# Patient Record
Sex: MALE | Race: WHITE | NOT HISPANIC OR LATINO | Employment: FULL TIME
[De-identification: names, ages, dates, MRNs, and addresses within clinical notes are randomized per-mention and may not be internally consistent; named-entity substitution may affect disease eponyms.]

---

## 2017-07-15 ENCOUNTER — HEALTH MAINTENANCE LETTER (OUTPATIENT)
Age: 22
End: 2017-07-15

## 2020-03-08 ENCOUNTER — HOSPITAL ENCOUNTER (EMERGENCY)
Facility: CLINIC | Age: 25
Discharge: HOME | End: 2020-03-08
Attending: EMERGENCY MEDICINE | Admitting: EMERGENCY MEDICINE
Payer: COMMERCIAL

## 2020-03-08 ENCOUNTER — APPOINTMENT (OUTPATIENT)
Dept: GENERAL RADIOLOGY | Facility: CLINIC | Age: 25
End: 2020-03-08
Attending: EMERGENCY MEDICINE
Payer: COMMERCIAL

## 2020-03-08 VITALS — SYSTOLIC BLOOD PRESSURE: 124 MMHG | DIASTOLIC BLOOD PRESSURE: 69 MMHG | OXYGEN SATURATION: 100 % | HEART RATE: 72 BPM

## 2020-03-08 DIAGNOSIS — S42.022A CLOSED DISPLACED FRACTURE OF SHAFT OF LEFT CLAVICLE, INITIAL ENCOUNTER: ICD-10-CM

## 2020-03-08 PROCEDURE — 23500 CLTX CLAVICULAR FX W/O MNPJ: CPT | Mod: 54 | Performed by: EMERGENCY MEDICINE

## 2020-03-08 PROCEDURE — 23500 CLTX CLAVICULAR FX W/O MNPJ: CPT | Mod: LT | Performed by: EMERGENCY MEDICINE

## 2020-03-08 PROCEDURE — 99284 EMERGENCY DEPT VISIT MOD MDM: CPT | Mod: 25 | Performed by: EMERGENCY MEDICINE

## 2020-03-08 PROCEDURE — 25000132 ZZH RX MED GY IP 250 OP 250 PS 637: Performed by: EMERGENCY MEDICINE

## 2020-03-08 PROCEDURE — 99284 EMERGENCY DEPT VISIT MOD MDM: CPT | Mod: Z6 | Performed by: EMERGENCY MEDICINE

## 2020-03-08 PROCEDURE — 73000 X-RAY EXAM OF COLLAR BONE: CPT | Mod: LT

## 2020-03-08 RX ORDER — OXYCODONE AND ACETAMINOPHEN 5; 325 MG/1; MG/1
1-2 TABLET ORAL EVERY 4 HOURS PRN
Qty: 10 TABLET | Refills: 0 | Status: SHIPPED | OUTPATIENT
Start: 2020-03-08 | End: 2020-12-22

## 2020-03-08 RX ORDER — DIAZEPAM 5 MG
5 TABLET ORAL ONCE
Status: COMPLETED | OUTPATIENT
Start: 2020-03-08 | End: 2020-03-08

## 2020-03-08 RX ADMIN — DIAZEPAM 5 MG: 5 TABLET ORAL at 16:48
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
## 2020-03-08 NOTE — ED AVS SNAPSHOT
[facility] Emergency Department  [address] [address]  Phone:  [phone]  Fax:  [phone]                                    Ryan Rose   MRN: [id number]    Department:  [facility] Emergency Department   Date of Visit:  3/8/2020           After Visit Summary Signature Page    I have received my discharge instructions, and my questions have been answered. I have discussed any challenges I see with this plan with the nurse or doctor.    ..........................................................................................................................................  Patient/Patient Representative Signature      ..........................................................................................................................................  Patient Representative Print Name and Relationship to Patient    ..................................................               ................................................  Date                                   Time    ..........................................................................................................................................  Reviewed by Signature/Title    ...................................................              ..............................................  Date                                               Time          22EPIC Rev 08/18

## 2020-03-09 NOTE — DISCHARGE INSTRUCTIONS
Return if symptoms worsen or new symptoms develop.  Follow-up with orthopedics next available.  Drink plenty of fluids.  If increased pain numbness weakness or other symptoms present please return for recheck.  Take Percocet as directed for pain.

## 2020-11-16 ENCOUNTER — HEALTH MAINTENANCE LETTER (OUTPATIENT)
Age: 25
End: 2020-11-16

## 2020-12-22 ENCOUNTER — OFFICE VISIT (OUTPATIENT)
Dept: FAMILY MEDICINE | Facility: CLINIC | Age: 25
End: 2020-12-22
Payer: COMMERCIAL

## 2020-12-22 VITALS
BODY MASS INDEX: 24.41 KG/M2 | TEMPERATURE: 98.1 F | HEIGHT: 70 IN | WEIGHT: 170.5 LBS | DIASTOLIC BLOOD PRESSURE: 80 MMHG | HEART RATE: 76 BPM | RESPIRATION RATE: 16 BRPM | SYSTOLIC BLOOD PRESSURE: 122 MMHG

## 2020-12-22 DIAGNOSIS — Z00.00 HEALTHCARE MAINTENANCE: Primary | ICD-10-CM

## 2020-12-22 PROCEDURE — 90715 TDAP VACCINE 7 YRS/> IM: CPT | Performed by: FAMILY MEDICINE

## 2020-12-22 PROCEDURE — 99385 PREV VISIT NEW AGE 18-39: CPT | Mod: 25 | Performed by: FAMILY MEDICINE

## 2020-12-22 PROCEDURE — 90471 IMMUNIZATION ADMIN: CPT | Performed by: FAMILY MEDICINE

## 2020-12-22 ASSESSMENT — MIFFLIN-ST. JEOR: SCORE: 1769.63

## 2020-12-22 NOTE — PROGRESS NOTES
Assessment/Plan:     Healthcare Maintenance Exam    No labs drawn today  Immunizations updated - TDAP - refused influenza  Healthy lifestyle modifications discussed  Discussed self testicular exams    1. Healthcare maintenance  - TDAP VACCINE (Adacel, Boostrix)  [[id number]]    With regard to his irritated lymph nodes I am unsure if this is due to some potential allergies versus physical irritation with him rubbing them.  Discussed taking daily Claritin.  Certainly this becomes bothersome again we could do an ultrasound to further evaluate.  He will contact me if he has questions with it.         Subjective:     Ryan Rose is a 25 year old male who presents for an annual exam. Concerns are as follows:    Lymph nodes: Patient states that he has some lymph nodes in his neck that were swollen on and off for the last 6 months.  He states that when he was drinking an energy drink these would get a bit worse that he is doing and he still some bothersome.  The areas are tender to the touch occasionally.    Healthy Habits:   Regular Exercise: yes  Sunscreen Use: yes  Healthy Diet: yes  Dental Visits Regularly: yes  Seat Belt: yes  Sexually active: yes  Self Testicular Exam Monthly:yes  Colonoscopy: n/a      Immunization History   Administered Date(s) Administered     DTAP (<7y) 05/12/2003     HEPA 04/09/2012     HepB 10/06/2003     Influenza (IIV3) PF 11/15/2007, 10/28/2008, 12/10/2010, 10/18/2011     MMR 10/18/2011     Meningococcal (Menactra ) 04/09/2012     Poliovirus, inactivated (IPV) 05/12/2003     TDAP Vaccine (Adacel) 08/25/2010     Tdap (Adacel,Boostrix) 12/22/2020     Varicella Pt Report Hx of Varicella/Chicken Pox 09/18/2003     Immunization status: needs TDAP and influenza.      No current outpatient medications on file.     Past Medical History:   Diagnosis Date     ACL tear      ADHD (attention deficit hyperactivity disorder)      NO ACTIVE PROBLEMS      Past Surgical History:   Procedure Laterality Date      ARTHROSCOPIC RECONSTRUCTION ANTERIOR CRUCIATE LIGAMENT  12/21/2012    Procedure: ARTHROSCOPIC RECONSTRUCTION ANTERIOR CRUCIATE LIGAMENT;  Right knee Arthroscopy, Anterior Cruciate Ligament Reconstruction, Hamstring Autograft, Lateral Meniscus Repair   Surgeon Request Choice Anesthesia ;  Surgeon: Marcelino Trujillo MD;  [facility]     ARTHROSCOPY KNEE WITH MENISCAL REPAIR  12/21/2012    Procedure: ARTHROSCOPY KNEE WITH MENISCAL REPAIR;;  Surgeon: Marcelino Trujillo MD;  [facility]     TONSILLECTOMY & ADENOIDECTOMY  12/9/2002     TONSILLECTOMY & ADENOIDECTOMY       Patient has no known allergies.  Family History   Problem Relation Age of Onset     Allergies Father      Heart Disease Maternal Grandfather      Alcohol/Drug Maternal Grandfather      Allergies Maternal Grandfather      Psychotic Disorder Maternal Grandmother         bipolar     Asthma Maternal Grandmother      Hypertension Maternal Grandmother      Allergies Maternal Grandmother      Psychotic Disorder Maternal Uncle         LD AND ADHD     Psychotic Disorder Other         COUSINS WITH OCD/ADHD     Allergies Mother      C.A.D. Maternal Grandmother      Allergies Maternal Grandmother      Allergies Paternal Grandmother      Hypertension Paternal Grandfather      Allergies Paternal Grandfather      Diabetes No family hx of      Cerebrovascular Disease No family hx of      Breast Cancer No family hx of      Cancer - colorectal No family hx of      Prostate Cancer No family hx of      Social History     Socioeconomic History     Marital status:      Spouse name: Not on file     Number of children: 0     Years of education: 7th     Highest education level: Not on file   Occupational History     Employer: [employer]   Social Needs     Financial resource strain: Not on file     Food insecurity     Worry: Not on file     Inability: Not on file     Transportation needs     Medical: Not on file     Non-medical: Not on file  "  Tobacco Use     Smoking status: Never Smoker     Smokeless tobacco: Never Used     Tobacco comment: NO EXPOSURE   Substance and Sexual Activity     Alcohol use: No     Drug use: No     Sexual activity: Never   Lifestyle     Physical activity     Days per week: Not on file     Minutes per session: Not on file     Stress: Not on file   Relationships     Social connections     Talks on phone: Not on file     Gets together: Not on file     Attends Islam service: Not on file     Active member of club or organization: Not on file     Attends meetings of clubs or organizations: Not on file     Relationship status: Not on file     Intimate partner violence     Fear of current or ex partner: Not on file     Emotionally abused: Not on file     Physically abused: Not on file     Forced sexual activity: Not on file   Other Topics Concern     Not on file   Social History Narrative    Lives with mother, father and brother, sister, cousin and two other second cousins.         Review of Systems  General:  Denies problems  Eyes:  Denies problems  Ears/Nose/Throat:  Denies problems  Cardiovascular:  Denies problems  Respiratory:  Denies problems  Gastrointestinal:  Denies problems  Genitourinary:  Denies problems  Musculoskeletal:  Denies problems  Skin:  Denies problems  Neurologic:  Denies problems  Psychiatric:  Denies problems  Endocrine:  Denies problems  Heme/Lymphatic:  Denies problems  Allergic/Immunologic:  Denies problems         Objective:      Vitals:    12/22/20 0804   BP: 122/80   Pulse: 76   Resp: 16   Temp: 98.1  F (36.7  C)   TempSrc: Tympanic   Weight: 77.3 kg (170 lb 8 oz)   Height: 1.77 m (5' 9.69\")         Physical Exam:  General Appearance: Alert, cooperative, no distress, appears stated age  Head: Normocephalic, without obvious abnormality, atraumatic  Eyes: PERRL, conjunctiva/corneas clear, EOM's intact  Ears: Normal TM's and external ear canals, both ears   Neck: Supple, symmetrical, trachea midline, no " adenopathy;  thyroid: not enlarged, symmetric, no tenderness/mass/nodules  Back: Symmetric, no curvature, ROM normal, no CVA tenderness   Lungs: Clear to auscultation bilaterally, respirations unlabored  Chest: no visible abnormalities.  Heart: Regular rate and rhythm, S1 and S2 normal, no murmur, rub, or gallop,   Abdomen: Soft, non-tender, bowel sounds active all four quadrants,  no masses, no organomegaly  Extremities: Extremities normal, atraumatic, no cyanosis or edema  Skin: Skin color, texture, turgor normal, no rashes or lesions  Lymph nodes: Cervical, supraclavicular, and axillary nodes normal  Neurologic: Normal

## 2021-09-18 ENCOUNTER — HEALTH MAINTENANCE LETTER (OUTPATIENT)
Age: 26
End: 2021-09-18

## 2022-02-27 ENCOUNTER — HEALTH MAINTENANCE LETTER (OUTPATIENT)
Age: 27
End: 2022-02-27

## 2022-06-17 ENCOUNTER — OFFICE VISIT (OUTPATIENT)
Dept: FAMILY MEDICINE | Facility: CLINIC | Age: 27
End: 2022-06-17
Payer: COMMERCIAL

## 2022-06-17 VITALS
OXYGEN SATURATION: 97 % | BODY MASS INDEX: 25.34 KG/M2 | RESPIRATION RATE: 16 BRPM | HEART RATE: 74 BPM | WEIGHT: 177 LBS | HEIGHT: 70 IN | TEMPERATURE: 98.1 F

## 2022-06-17 DIAGNOSIS — R09.81 NASAL CONGESTION: Primary | ICD-10-CM

## 2022-06-17 PROCEDURE — 99213 OFFICE O/P EST LOW 20 MIN: CPT | Performed by: FAMILY MEDICINE

## 2022-06-17 RX ORDER — OMEGA-3 FATTY ACIDS/FISH OIL 300-1000MG
200 CAPSULE ORAL PRN
COMMUNITY

## 2022-06-17 ASSESSMENT — PAIN SCALES - GENERAL: PAINLEVEL: NO PAIN (0)

## 2022-06-17 NOTE — PROGRESS NOTES
"  Assessment & Plan     Nasal congestion  Chronic nasal congestion, has already done OTC meds and cares.  Now having worsening symptoms, affecting activity and sleep due to nasal congestion.  Will get further eval  - Adult ENT  Referral; Future             BMI:   Estimated body mass index is 25.63 kg/m  as calculated from the following:    Height as of this encounter: 1.77 m (5' 9.69\").    Weight as of this encounter: 80.3 kg (177 lb).       See Patient Instructions    Return in about 4 weeks (around 7/15/2022) for If not better.    Daniel Hamlin MD  [facility]    Eliel Davis is a 26 year old, presenting for the following health issues:    Nasal Problem (Has been having nasal issues. Smells are staying in his nose. [place] smell, also smells like an ashtray. Patient states that the smell gives him a headache. Been going on 5-6 months.) and Allergies (Patient has been experiencing bad allergies with the aggressive nose pain. Felt like a lot of pressure in his nose. Allergies have been getting worse for the the last year and a half.)      History of Present Illness       Reason for visit:  Nose problems    He eats 2-3 servings of fruits and vegetables daily.He consumes 0 sweetened beverage(s) daily.He exercises with enough effort to increase his heart rate 30 to 60 minutes per day.  He exercises with enough effort to increase his heart rate 4 days per week.   He is taking medications regularly.       Concern - Nasal issues/allergies  Onset: On-going about 5-6 months  Description: Smells are staying in his nose. [place] smell, also smells like an ashtray. Patient states that the smell gives him a headache. Patient has been experiencing bad allergies with the aggressive nose pain. Felt like a lot of pressure in his nose. Allergies have been getting worse for the the last year and a half.  Intensity: severe  Progression of Symptoms:  same  Accompanying Signs & Symptoms: headache, " "dry throat, increasingly fatigue  Previous history of similar problem: none  Precipitating factors:        Worsened by: gets worse as the morning goes on  Alleviating factors:        Improved by: none  Therapies tried and outcome: has tried radha pot type therapy, didn't help at all. The water came out his ear while using this and patient states that it was very uncomfortable         Review of Systems         Objective    Pulse 74   Temp 98.1  F (36.7  C) (Tympanic)   Resp 16   Ht 1.77 m (5' 9.69\")   Wt 80.3 kg (177 lb)   SpO2 97%   BMI 25.63 kg/m    Body mass index is 25.63 kg/m .  Physical Exam   GENERAL APPEARANCE: healthy, alert and no distress  HENT: ear canals and TM's normal and nose and mouth without ulcers or lesions  NECK: no adenopathy, no asymmetry, masses, or scars and thyroid normal to palpation  RESP: lungs clear to auscultation - no rales, rhonchi or wheezes  CV: regular rates and rhythm, normal S1 S2, no S3 or S4 and no murmur, click or rub                    .  ..  "

## 2022-06-17 NOTE — PATIENT INSTRUCTIONS
Please see ENT for further evaluation of your nose.    You have already used allergra, nasal steroid, and netti pot like mechanism.          Thank you for choosing [facility].  You may be receiving an email and/or telephone survey request from [facility] regarding your visit today.  Please take a few minutes to respond to the survey to let us know how we are doing.      If you have questions or concerns, please contact us via [employer] or you can contact your care team at [phone] option [phone].    Our Clinic hours are:  Monday - Thursday 7am-6pm  Friday 7am-5pm    The [place] outpatient lab hours are:  Monday - Friday 7am-4:30pm    Appointments are required, call [phone]    If you have clinical questions after hours or would like to schedule an appointment,  call the clinic at [phone].

## 2022-08-10 NOTE — PROGRESS NOTES
Chief Complaint   Patient presents with     Nasal Congestion     Nasal Congestion:Patient reports smelling a constant Cigarette smoke,blowing nose and lavaging nose still feels clogged.Here to discus with provider     History of Present Illness   Ryan Rose is a 26 year old male who presents today for evaluation.  I am seeing this patient in consultation for nasal congestion at the request of the provider Dr. Hamlin. The patient describes symptoms of bilateral nasal obstruction for the past several years. The patient notes symptoms most predominately on the left side.  No significant epistaxis. The patient notes a past history of environmental allergies. The patient has not been tested for allergies in the past. Treatments have included nasal irrigations, nasal steroids, and oral antihistamines. The treatments seem to help some although he stopped the Flonase because it was drying out his nose.  No specific history of nasal trauma. No prior history of nose or sinus surgery.  No history of smoking. Patient reports bilateral nasal congestion, post nasal drainage, and intermittent face pain/pressure/fullness in the malar areas.  For the past several months, he is also noted a sense of smoky smell in his nose that was not quite strong.  Over the past 2 weeks or so, it has slowly improved.  He did notice at the onset that he had ammonia smell in his nose and then this devolved into a smoky smell.  He feels like his smell is diminished overall but not completely gone.  He feels like his sense of taste is normal.  He did had COVID in November 2020.  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    Past Medical History  Patient Active Problem List   Diagnosis     Problems with learning     Attention deficit disorder of childhood     ACL tear     Lateral meniscal tear     Current Medications     Current Outpatient Medications:      budesonide (PULMICORT) 0.25 MG/2ML neb solution, Take 2 mLs (0.25 mg) by  nebulization daily Place 0.25 mg/2 mL budesonide vial in 8 oz normal saline sinus rinse bottle.  Irrigate each nostril with one half of the bottle daily., Disp: 180 mL, Rfl: 3     ibuprofen (ADVIL/MOTRIN) 200 MG capsule, Take 200 mg by mouth as needed for fever (Patient not taking: Reported on 8/18/2022), Disp: , Rfl:     Allergies  Allergies   Allergen Reactions     Seasonal Allergies        Social History   Social History     Socioeconomic History     Marital status:      Number of children: 0     Years of education: 7th   Occupational History     Employer: [employer]   Tobacco Use     Smoking status: Never Smoker     Smokeless tobacco: Never Used     Tobacco comment: NO EXPOSURE   Vaping Use     Vaping Use: Every day   Substance and Sexual Activity     Alcohol use: Yes     Comment: occasional     Drug use: No     Sexual activity: Yes     Partners: Female   Social History Narrative    Lives with mother, father and brother, sister, cousin and two other second cousins.         Family History  Family History   Problem Relation Age of Onset     Allergies Father      Heart Disease Maternal Grandfather      Alcohol/Drug Maternal Grandfather      Allergies Maternal Grandfather      Psychotic Disorder Maternal Grandmother         bipolar     Asthma Maternal Grandmother      Hypertension Maternal Grandmother      Allergies Maternal Grandmother      Psychotic Disorder Maternal Uncle         LD AND ADHD     Psychotic Disorder Other         COUSINS WITH OCD/ADHD     Allergies Mother      C.A.D. Maternal Grandmother      Allergies Maternal Grandmother      Allergies Paternal Grandmother      Hypertension Paternal Grandfather      Allergies Paternal Grandfather      Diabetes No family hx of      Cerebrovascular Disease No family hx of      Breast Cancer No family hx of      Cancer - colorectal No family hx of      Prostate Cancer No family hx of        Review of Systems  As per HPI and PMHx, otherwise 10+ comprehensive system  "review is negative.    Physical Exam  /74 (BP Location: Right arm, Patient Position: Sitting, Cuff Size: Adult Regular)   Pulse 93   Ht 1.778 m (5' 10\")   Wt 79.8 kg (176 lb)   SpO2 96%   BMI 25.25 kg/m    GENERAL: Patient is a pleasant, cooperative 26 year old male in no acute distress.  HEAD: Normocephalic, atraumatic.  Hair and scalp are normal.  EYES: Pupils are equal, round, reactive to light and accommodation.  Extraocular movements are intact.  The sclera nonicteric without injection.  The extraocular structures are normal.  EARS: Normal shape and symmetry.  No tenderness when palpating the mastoid or tragal areas bilaterally.  Otoscopic exam reveals a minimal amount of cerumen bilaterally.  The bilateral tympanic membranes are round, intact without evidence of effusion, good landmarks.  No retraction, granulation, or drainage.  NOSE: Nares are patent.  Nasal mucosa is boggy and inflamed with sticky, inflammatory mucus.  The patient has severe/marked inferior turban hypertrophy greater on the left.  Nasal septum deviates to the right roughly mid septum with a spur that contacts the inferior turbinate.  No nasal cavity masses, polyps, or mucopurulence on anterior rhinoscopy.  ORAL CAVITY: Dentition is in good repair.  Mucous membranes are moist.  Tongue is mobile, protrudes to the midline.  Palate elevates symmetrically.  Tonsils are surgically absent.  No erythema or exudate.  No oral cavity or oropharyngeal masses, lesions, ulcerations, leukoplakia.  NEUROLOGIC: Cranial nerves II through XII are grossly intact.  Voice is strong.  Patient is House-Brackmann I/VI bilaterally.  CARDIOVASCULAR: Extremities are warm and well-perfused.  No significant peripheral edema.  RESPIRATORY: Patient has nonlabored breathing without cough, wheeze, stridor.  PSYCHIATRIC: Patient is alert and oriented.  Mood and affect appear normal.  SKIN: Warm and dry.  No scalp, face, or neck lesions noted.    Procedure: " Flexible Nasal Endoscopy  Indication: Obstruction, nasal congestion, altered sense of smell    To best visualize the sinonasal anatomy and due to the chief complaint and HPI, I proceeded with flexible fiberoptic nasal endoscopy.  The bilateral nasal cavities were anesthetized and decongested with a mixture of lidocaine and neosynephrine.  The bilateral nasal cavities were then examined using flexible fiberoptic nasal endoscope.  The right nasal cavity was without masses, polyps, or mucopurulence.  The right middle turbinate and middle meatus was normal in appearance.  The sphenoethmoid recess was clear.  The left nasal cavity was without masses, polyps, or mucopurulence.  The left middle turbinate and middle meatus was normal in appearance.  The sphenoethmoid recess was clear.  The sinonasal mucosa appeared boggy and inflamed with sticky, inflammatory mucus.  The nasal septum deviates to the right roughly mid septum with a spur that contacts the inferior turbinate.  The nasopharynx had a normal appearance with normal Eustachian tube openings and fossa of Rosenmuller bilaterally.  Minimal adenoid tissue.  The scope was removed.  The patient tolerated the procedure well.    Assessment and Plan     ICD-10-CM    1. Nasal congestion  R09.81 NASAL ENDOSCOPY, DIAGNOSTIC     Adult ENT  Referral     budesonide (PULMICORT) 0.25 MG/2ML neb solution     Adult Allergy/Asthma Referral   2. Deviated nasal septum  J34.2 NASAL ENDOSCOPY, DIAGNOSTIC     budesonide (PULMICORT) 0.25 MG/2ML neb solution     Adult Allergy/Asthma Referral   3. Hypertrophy of both inferior nasal turbinates  J34.3 NASAL ENDOSCOPY, DIAGNOSTIC     budesonide (PULMICORT) 0.25 MG/2ML neb solution     Adult Allergy/Asthma Referral   4. History of allergic rhinitis  Z87.09 NASAL ENDOSCOPY, DIAGNOSTIC     budesonide (PULMICORT) 0.25 MG/2ML neb solution     Adult Allergy/Asthma Referral   5. Smell disorder  R43.9 NASAL ENDOSCOPY, DIAGNOSTIC     budesonide  (PULMICORT) 0.25 MG/2ML neb solution     Adult Allergy/Asthma Referral     It was my pleasure seeing Ryan Rose today in clinic.  The patient presents with nasal obstruction and altered sense of smell.  The smoky smell in his nose has slowly been improving but is still present.  He does report bilateral nasal obstruction worse on the left-hand side.  His clinical exam does show septal deviation to the right and severe/marked inferior turban hypertrophy.  We discussed a trial of budesonide irrigations to see if this will help decrease inflammation in his nose.  We will have him do the budesonide irrigations once daily.  I do think allergy evaluation is warranted and consideration of allergy testing could be helpful.  If medical management fails the next steps may include nasal surgery in the form of septoplasty with inferior turbinate reduction and/or out-fracture.  I will contact him in roughly 6 weeks to check symptoms.  If he is not improving, we will obtain a CT scan of his sinuses as part of his follow-up.    Juarez Hastings MD  Department of Otolaryngology-Head and Neck Surgery  -[facility]

## 2022-08-18 ENCOUNTER — OFFICE VISIT (OUTPATIENT)
Dept: OTOLARYNGOLOGY | Facility: CLINIC | Age: 27
End: 2022-08-18
Attending: FAMILY MEDICINE
Payer: COMMERCIAL

## 2022-08-18 VITALS
HEIGHT: 70 IN | BODY MASS INDEX: 25.2 KG/M2 | WEIGHT: 176 LBS | SYSTOLIC BLOOD PRESSURE: 119 MMHG | HEART RATE: 93 BPM | DIASTOLIC BLOOD PRESSURE: 74 MMHG | OXYGEN SATURATION: 96 %

## 2022-08-18 DIAGNOSIS — J34.2 DEVIATED NASAL SEPTUM: ICD-10-CM

## 2022-08-18 DIAGNOSIS — J34.3 HYPERTROPHY OF BOTH INFERIOR NASAL TURBINATES: ICD-10-CM

## 2022-08-18 DIAGNOSIS — R43.9 SMELL DISORDER: ICD-10-CM

## 2022-08-18 DIAGNOSIS — R09.81 NASAL CONGESTION: Primary | ICD-10-CM

## 2022-08-18 DIAGNOSIS — Z87.09 HISTORY OF ALLERGIC RHINITIS: ICD-10-CM

## 2022-08-18 PROCEDURE — 31231 NASAL ENDOSCOPY DX: CPT | Performed by: OTOLARYNGOLOGY

## 2022-08-18 PROCEDURE — 99203 OFFICE O/P NEW LOW 30 MIN: CPT | Mod: 25 | Performed by: OTOLARYNGOLOGY

## 2022-08-18 RX ORDER — BUDESONIDE 0.25 MG/2ML
0.25 INHALANT ORAL DAILY
Qty: 180 ML | Refills: 3 | Status: SHIPPED | OUTPATIENT
Start: 2022-08-18

## 2022-08-18 NOTE — NURSING NOTE
"Chief Complaint   Patient presents with     Nasal Congestion     Nasal Congestion:Patient reports smelling a constant Cigarette smoke,blowing nose and lavaging nose still feels clogged.Here to discus with provider       Vitals:    08/18/22 1404   BP: 119/74   BP Location: Right arm   Patient Position: Sitting   Cuff Size: Adult Regular   Pulse: 93   SpO2: 96%   Weight: 79.8 kg (176 lb)   Height: 1.778 m (5' 10\")     Wt Readings from Last 1 Encounters:   08/18/22 79.8 kg (176 lb)       Lisandra Georges MA      "

## 2022-08-18 NOTE — LETTER
8/18/2022         RE: Ryan Rose  [address] [facility] [address]        Dear Colleague,    Thank you for referring your patient, Ryan Rose, to the [facility]. Please see a copy of my visit note below.    Chief Complaint   Patient presents with     Nasal Congestion     Nasal Congestion:Patient reports smelling a constant Cigarette smoke,blowing nose and lavaging nose still feels clogged.Here to discus with provider     History of Present Illness   Ryan Rose is a 26 year old male who presents today for evaluation.  I am seeing this patient in consultation for nasal congestion at the request of the provider Dr. Hamlin. The patient describes symptoms of bilateral nasal obstruction for the past several years. The patient notes symptoms most predominately on the left side.  No significant epistaxis. The patient notes a past history of environmental allergies. The patient has not been tested for allergies in the past. Treatments have included nasal irrigations, nasal steroids, and oral antihistamines. The treatments seem to help some although he stopped the Flonase because it was drying out his nose.  No specific history of nasal trauma. No prior history of nose or sinus surgery.  No history of smoking. Patient reports bilateral nasal congestion, post nasal drainage, and intermittent face pain/pressure/fullness in the malar areas.  For the past several months, he is also noted a sense of smoky smell in his nose that was not quite strong.  Over the past 2 weeks or so, it has slowly improved.  He did notice at the onset that he had ammonia smell in his nose and then this devolved into a smoky smell.  He feels like his smell is diminished overall but not completely gone.  He feels like his sense of taste is normal.  He did had COVID in November 2020.  He does work [employer] and is around a lot of dust.    Past Medical History  Patient Active Problem List   Diagnosis      Problems with learning     Attention deficit disorder of childhood     ACL tear     Lateral meniscal tear     Current Medications     Current Outpatient Medications:      budesonide (PULMICORT) 0.25 MG/2ML neb solution, Take 2 mLs (0.25 mg) by nebulization daily Place 0.25 mg/2 mL budesonide vial in 8 oz normal saline sinus rinse bottle.  Irrigate each nostril with one half of the bottle daily., Disp: 180 mL, Rfl: 3     ibuprofen (ADVIL/MOTRIN) 200 MG capsule, Take 200 mg by mouth as needed for fever (Patient not taking: Reported on 8/18/2022), Disp: , Rfl:     Allergies  Allergies   Allergen Reactions     Seasonal Allergies        Social History   Social History     Socioeconomic History     Marital status:      Number of children: 0     Years of education: 7th   Occupational History     Employer: [employer]ILD   Tobacco Use     Smoking status: Never Smoker     Smokeless tobacco: Never Used     Tobacco comment: NO EXPOSURE   Vaping Use     Vaping Use: Every day   Substance and Sexual Activity     Alcohol use: Yes     Comment: occasional     Drug use: No     Sexual activity: Yes     Partners: Female   Social History Narrative    Lives with mother, father and brother, sister, cousin and two other second cousins.         Family History  Family History   Problem Relation Age of Onset     Allergies Father      Heart Disease Maternal Grandfather      Alcohol/Drug Maternal Grandfather      Allergies Maternal Grandfather      Psychotic Disorder Maternal Grandmother         bipolar     Asthma Maternal Grandmother      Hypertension Maternal Grandmother      Allergies Maternal Grandmother      Psychotic Disorder Maternal Uncle         LD AND ADHD     Psychotic Disorder Other         COUSINS WITH OCD/ADHD     Allergies Mother      C.A.D. Maternal Grandmother      Allergies Maternal Grandmother      Allergies Paternal Grandmother      Hypertension Paternal Grandfather      Allergies Paternal Grandfather      Diabetes No family  "hx of      Cerebrovascular Disease No family hx of      Breast Cancer No family hx of      Cancer - colorectal No family hx of      Prostate Cancer No family hx of        Review of Systems  As per HPI and PMHx, otherwise 10+ comprehensive system review is negative.    Physical Exam  /74 (BP Location: Right arm, Patient Position: Sitting, Cuff Size: Adult Regular)   Pulse 93   Ht 1.778 m (5' 10\")   Wt 79.8 kg (176 lb)   SpO2 96%   BMI 25.25 kg/m    GENERAL: Patient is a pleasant, cooperative 26 year old male in no acute distress.  HEAD: Normocephalic, atraumatic.  Hair and scalp are normal.  EYES: Pupils are equal, round, reactive to light and accommodation.  Extraocular movements are intact.  The sclera nonicteric without injection.  The extraocular structures are normal.  EARS: Normal shape and symmetry.  No tenderness when palpating the mastoid or tragal areas bilaterally.  Otoscopic exam reveals a minimal amount of cerumen bilaterally.  The bilateral tympanic membranes are round, intact without evidence of effusion, good landmarks.  No retraction, granulation, or drainage.  NOSE: Nares are patent.  Nasal mucosa is boggy and inflamed with sticky, inflammatory mucus.  The patient has severe/marked inferior turban hypertrophy greater on the left.  Nasal septum deviates to the right roughly mid septum with a spur that contacts the inferior turbinate.  No nasal cavity masses, polyps, or mucopurulence on anterior rhinoscopy.  ORAL CAVITY: Dentition is in good repair.  Mucous membranes are moist.  Tongue is mobile, protrudes to the midline.  Palate elevates symmetrically.  Tonsils are surgically absent.  No erythema or exudate.  No oral cavity or oropharyngeal masses, lesions, ulcerations, leukoplakia.  NEUROLOGIC: Cranial nerves II through XII are grossly intact.  Voice is strong.  Patient is House-Brackmann I/VI bilaterally.  CARDIOVASCULAR: Extremities are warm and well-perfused.  No significant peripheral " edema.  RESPIRATORY: Patient has nonlabored breathing without cough, wheeze, stridor.  PSYCHIATRIC: Patient is alert and oriented.  Mood and affect appear normal.  SKIN: Warm and dry.  No scalp, face, or neck lesions noted.    Procedure: Flexible Nasal Endoscopy  Indication: Obstruction, nasal congestion, altered sense of smell    To best visualize the sinonasal anatomy and due to the chief complaint and HPI, I proceeded with flexible fiberoptic nasal endoscopy.  The bilateral nasal cavities were anesthetized and decongested with a mixture of lidocaine and neosynephrine.  The bilateral nasal cavities were then examined using flexible fiberoptic nasal endoscope.  The right nasal cavity was without masses, polyps, or mucopurulence.  The right middle turbinate and middle meatus was normal in appearance.  The sphenoethmoid recess was clear.  The left nasal cavity was without masses, polyps, or mucopurulence.  The left middle turbinate and middle meatus was normal in appearance.  The sphenoethmoid recess was clear.  The sinonasal mucosa appeared boggy and inflamed with sticky, inflammatory mucus.  The nasal septum deviates to the right roughly mid septum with a spur that contacts the inferior turbinate.  The nasopharynx had a normal appearance with normal Eustachian tube openings and fossa of Rosenmuller bilaterally.  Minimal adenoid tissue.  The scope was removed.  The patient tolerated the procedure well.    Assessment and Plan     ICD-10-CM    1. Nasal congestion  R09.81 NASAL ENDOSCOPY, DIAGNOSTIC     Adult ENT  Referral     budesonide (PULMICORT) 0.25 MG/2ML neb solution     Adult Allergy/Asthma Referral   2. Deviated nasal septum  J34.2 NASAL ENDOSCOPY, DIAGNOSTIC     budesonide (PULMICORT) 0.25 MG/2ML neb solution     Adult Allergy/Asthma Referral   3. Hypertrophy of both inferior nasal turbinates  J34.3 NASAL ENDOSCOPY, DIAGNOSTIC     budesonide (PULMICORT) 0.25 MG/2ML neb solution     Adult  Allergy/Asthma Referral   4. History of allergic rhinitis  Z87.09 NASAL ENDOSCOPY, DIAGNOSTIC     budesonide (PULMICORT) 0.25 MG/2ML neb solution     Adult Allergy/Asthma Referral   5. Smell disorder  R43.9 NASAL ENDOSCOPY, DIAGNOSTIC     budesonide (PULMICORT) 0.25 MG/2ML neb solution     Adult Allergy/Asthma Referral     It was my pleasure seeing Ryan Rose today in clinic.  The patient presents with nasal obstruction and altered sense of smell.  The smoky smell in his nose has slowly been improving but is still present.  He does report bilateral nasal obstruction worse on the left-hand side.  His clinical exam does show septal deviation to the right and severe/marked inferior turban hypertrophy.  We discussed a trial of budesonide irrigations to see if this will help decrease inflammation in his nose.  We will have him do the budesonide irrigations once daily.  I do think allergy evaluation is warranted and consideration of allergy testing could be helpful.  If medical management fails the next steps may include nasal surgery in the form of septoplasty with inferior turbinate reduction and/or out-fracture.  I will contact him in roughly 6 weeks to check symptoms.  If he is not improving, we will obtain a CT scan of his sinuses as part of his follow-up.    Juarez Hastings MD  Department of Otolaryngology-Head and Neck Surgery  [facility]       Again, thank you for allowing me to participate in the care of your patient.        Sincerely,        Juarez Hastings MD

## 2022-08-18 NOTE — PATIENT INSTRUCTIONS
BUDESONIDE IRRIGATION INSTRUCTIONS    You will be starting Budesonide nasal irrigations and will need to obtain the following:      - NeilMed Sinus Rinse 8 oz Kit  - Distilled or filtered water   - Normal saline salt packets  - Budesonide medication     Place filtered or distilled water into the NeilMed bottle up to the fill line (DO NOT USE TAP OR WELL WATER).  Place the pre-made salt packet in the 8 oz of saline.  Then placed the budesonide medication into the bottle.  Shake the bottle to suspend into solution.  Lean head forward over a sink or a basin.  Rinse each side of the nose with one-half of the bottle (each squeeze is about one-half of the bottle). Rinse the nose daily.     You will follow up with your ENT surgeon in 8-12 weeks to recheck your symptoms.  If you are having problems with your irrigations or problems obtaining the medication, please contact your ENT surgeon.    Video example: [email or web address]

## 2022-11-19 ENCOUNTER — HEALTH MAINTENANCE LETTER (OUTPATIENT)
Age: 27
End: 2022-11-19

## 2023-04-09 ENCOUNTER — HEALTH MAINTENANCE LETTER (OUTPATIENT)
Age: 28
End: 2023-04-09

## 2024-06-16 ENCOUNTER — HEALTH MAINTENANCE LETTER (OUTPATIENT)
Age: 29
End: 2024-06-16

## 2025-06-21 ENCOUNTER — HEALTH MAINTENANCE LETTER (OUTPATIENT)
Age: 30
End: 2025-06-21